# Patient Record
Sex: FEMALE | Race: WHITE
[De-identification: names, ages, dates, MRNs, and addresses within clinical notes are randomized per-mention and may not be internally consistent; named-entity substitution may affect disease eponyms.]

---

## 2017-04-28 ENCOUNTER — HOSPITAL ENCOUNTER (INPATIENT)
Dept: HOSPITAL 10 - FTE | Age: 22
LOS: 2 days | Discharge: HOME | DRG: 770 | End: 2017-04-30
Attending: OBSTETRICS & GYNECOLOGY | Admitting: OBSTETRICS & GYNECOLOGY
Payer: COMMERCIAL

## 2017-04-28 VITALS
WEIGHT: 99.21 LBS | HEIGHT: 60 IN | WEIGHT: 99.21 LBS | BODY MASS INDEX: 19.48 KG/M2 | HEIGHT: 60 IN | BODY MASS INDEX: 19.48 KG/M2

## 2017-04-28 DIAGNOSIS — D62: ICD-10-CM

## 2017-04-28 DIAGNOSIS — O03.1: Primary | ICD-10-CM

## 2017-04-28 LAB
ADD SCAN DIFF: NO
ADD UMIC: YES
ALBUMIN SERPL-MCNC: 3.9 G/DL (ref 3.3–4.9)
ALBUMIN/GLOB SERPL: 1.34 {RATIO}
ALP SERPL-CCNC: 75 IU/L (ref 42–121)
ALT SERPL-CCNC: 20 IU/L (ref 13–69)
ANION GAP SERPL CALC-SCNC: 10 MMOL/L (ref 8–16)
AST SERPL-CCNC: 23 IU/L (ref 15–46)
BACTERIA #/AREA URNS HPF: (no result) /[HPF]
BASOPHILS # BLD AUTO: 0 10^3/UL (ref 0–0.1)
BASOPHILS NFR BLD: 0.1 % (ref 0–2)
BILIRUB DIRECT SERPL-MCNC: 0 MG/DL (ref 0–0.2)
BILIRUB SERPL-MCNC: 0.1 MG/DL (ref 0.2–1.3)
BUN SERPL-MCNC: 14 MG/DL (ref 7–20)
CALCIUM SERPL-MCNC: 9 MG/DL (ref 8.4–10.2)
CHLORIDE SERPL-SCNC: 103 MMOL/L (ref 97–110)
CO2 SERPL-SCNC: 24 MMOL/L (ref 21–31)
COLOR UR: (no result)
CREAT SERPL-MCNC: 0.58 MG/DL (ref 0.44–1)
EOSINOPHIL # BLD: 0 10^3/UL (ref 0–0.5)
EOSINOPHIL NFR BLD: 0 % (ref 0–7)
ERYTHROCYTE [DISTWIDTH] IN BLOOD BY AUTOMATED COUNT: 12.5 % (ref 11.5–14.5)
GLOBULIN SER-MCNC: 2.9 G/DL (ref 1.3–3.2)
GLUCOSE SERPL-MCNC: 134 MG/DL (ref 70–220)
GLUCOSE UR STRIP-MCNC: NEGATIVE %
HCT VFR BLD CALC: 23.6 % (ref 37–47)
HGB BLD-MCNC: 7.9 G/DL (ref 12–16)
KETONES UR STRIP.AUTO-MCNC: (no result) MG/DL
LYMPHOCYTES # BLD AUTO: 1.7 10^3/UL (ref 0.8–2.9)
LYMPHOCYTES NFR BLD AUTO: 7.9 % (ref 15–51)
MCH RBC QN AUTO: 30 PG (ref 29–33)
MCHC RBC AUTO-ENTMCNC: 33.5 G/DL (ref 32–37)
MCV RBC AUTO: 89.7 FL (ref 82–101)
MONOCYTES # BLD: 0.8 10^3/UL (ref 0.3–0.9)
MONOCYTES NFR BLD: 3.6 % (ref 0–11)
NEUTROPHILS # BLD: 19 10^3/UL (ref 1.6–7.5)
NEUTROPHILS NFR BLD AUTO: 87.5 % (ref 39–77)
NITRITE UR QL STRIP.AUTO: NEGATIVE
NRBC # BLD MANUAL: 0 10^3/UL (ref 0–0)
NRBC BLD QL: 0 /100WBC (ref 0–0)
PLATELET # BLD: 362 10^3/UL (ref 140–415)
PMV BLD AUTO: 9.9 FL (ref 7.4–10.4)
POTASSIUM SERPL-SCNC: 3.7 MMOL/L (ref 3.5–5.1)
PROT SERPL-MCNC: 6.8 G/DL (ref 6.1–8.1)
RBC # BLD AUTO: 2.63 10^6/UL (ref 4.2–5.4)
RBC # UR AUTO: (no result) /UL
RBC #/AREA URNS HPF: >200 /HPF
SODIUM SERPL-SCNC: 133 MMOL/L (ref 135–144)
SQUAMOUS #/AREA URNS HPF: (no result) /[HPF]
URINE BILIRUBIN (DIP): NEGATIVE
URINE TOTAL PROTEIN (DIP): (no result)
UROBILINOGEN UR STRIP-ACNC: (no result) (ref 0.1–1)
WBC # BLD AUTO: 21.7 10^3/UL (ref 4.8–10.8)
WBC # UR STRIP: NEGATIVE /UL

## 2017-04-28 PROCEDURE — 76830 TRANSVAGINAL US NON-OB: CPT

## 2017-04-28 PROCEDURE — 81001 URINALYSIS AUTO W/SCOPE: CPT

## 2017-04-28 PROCEDURE — P9016 RBC LEUKOCYTES REDUCED: HCPCS

## 2017-04-28 PROCEDURE — 71010: CPT

## 2017-04-28 PROCEDURE — P9059 PLASMA, FRZ BETWEEN 8-24HOUR: HCPCS

## 2017-04-28 PROCEDURE — 86920 COMPATIBILITY TEST SPIN: CPT

## 2017-04-28 PROCEDURE — 76856 US EXAM PELVIC COMPLETE: CPT

## 2017-04-28 PROCEDURE — 81003 URINALYSIS AUTO W/O SCOPE: CPT

## 2017-04-28 PROCEDURE — 36430 TRANSFUSION BLD/BLD COMPNT: CPT

## 2017-04-28 PROCEDURE — 93005 ELECTROCARDIOGRAM TRACING: CPT

## 2017-04-28 PROCEDURE — 85730 THROMBOPLASTIN TIME PARTIAL: CPT

## 2017-04-28 PROCEDURE — 86850 RBC ANTIBODY SCREEN: CPT

## 2017-04-28 PROCEDURE — 84702 CHORIONIC GONADOTROPIN TEST: CPT

## 2017-04-28 PROCEDURE — 86901 BLOOD TYPING SEROLOGIC RH(D): CPT

## 2017-04-28 PROCEDURE — 85610 PROTHROMBIN TIME: CPT

## 2017-04-28 PROCEDURE — 80053 COMPREHEN METABOLIC PANEL: CPT

## 2017-04-28 PROCEDURE — 86900 BLOOD TYPING SEROLOGIC ABO: CPT

## 2017-04-28 PROCEDURE — 86078 PHYS BLOOD BANK SERV REACTJ: CPT

## 2017-04-28 PROCEDURE — 85025 COMPLETE CBC W/AUTO DIFF WBC: CPT

## 2017-04-28 PROCEDURE — 88307 TISSUE EXAM BY PATHOLOGIST: CPT

## 2017-04-28 NOTE — RADRPT
PROCEDURE:   US Pelvis. 

 

CLINICAL INDICATION:   Vaginal bleeding after miscarriage 2 weeks ago

 

TECHNIQUE:   Multiple sonographic images of the pelvis were obtained utilizing a transabdominal and 
endovaginal technique.   The images were reviewed on a PACS workstation. 

 

COMPARISON:   None available 

 

FINDINGS:

 

Uterus: Normal in size, contour and echogenicity with no evidence for myometrial masses. Size is est
imated at 8.3 x 5.4 x 4.5 cm.

 

Cervix:  No abnormalities of significance are seen.

 

Endometrium:  Increased in thickness; 18.8 mm. Hypervascular flow on Doppler interrogation cannot ex
clude retained products of conception.

 

Right ovary / adnexa:  Normal in size estimated at 3 x 2.1 x 1.3 cm.  No evidence for masses, normal
 blood flow on Doppler interrogation.

 

Left ovary/adnexa: Normal in size estimated at 3.1 x 2.1 x 1.7 cm.  No evidence for solid masses, no
rmal blood flow on Doppler interrogation. 

 

Cul-de-sac:  No evidence of free fluid.  

 

 

RPTAT:HJJR

 

IMPRESSION:

 

1.  Thickened hypervascular endometrium of 18.8 mm unable to exclude retained products of conception
 given the provided history.

2.  Sonographically normal myometrium, ovaries and adnexa.

_____________________________________________ 

Physician Tigre           Date    Time 

Electronically viewed and signed by Physician Tigre on 04/28/2017 23:20 

 

D:  04/28/2017 23:20  T:  04/28/2017 23:20

/

## 2017-04-29 VITALS — SYSTOLIC BLOOD PRESSURE: 101 MMHG | RESPIRATION RATE: 16 BRPM | HEART RATE: 100 BPM | DIASTOLIC BLOOD PRESSURE: 56 MMHG

## 2017-04-29 VITALS — HEART RATE: 78 BPM | DIASTOLIC BLOOD PRESSURE: 58 MMHG | RESPIRATION RATE: 17 BRPM | SYSTOLIC BLOOD PRESSURE: 104 MMHG

## 2017-04-29 VITALS — RESPIRATION RATE: 16 BRPM | HEART RATE: 71 BPM | SYSTOLIC BLOOD PRESSURE: 96 MMHG | DIASTOLIC BLOOD PRESSURE: 57 MMHG

## 2017-04-29 VITALS — RESPIRATION RATE: 16 BRPM | DIASTOLIC BLOOD PRESSURE: 53 MMHG | HEART RATE: 83 BPM | SYSTOLIC BLOOD PRESSURE: 98 MMHG

## 2017-04-29 VITALS — SYSTOLIC BLOOD PRESSURE: 106 MMHG | RESPIRATION RATE: 17 BRPM | DIASTOLIC BLOOD PRESSURE: 60 MMHG | HEART RATE: 112 BPM

## 2017-04-29 VITALS — HEART RATE: 76 BPM | RESPIRATION RATE: 16 BRPM | DIASTOLIC BLOOD PRESSURE: 57 MMHG | SYSTOLIC BLOOD PRESSURE: 106 MMHG

## 2017-04-29 VITALS — RESPIRATION RATE: 14 BRPM | HEART RATE: 102 BPM | DIASTOLIC BLOOD PRESSURE: 57 MMHG | SYSTOLIC BLOOD PRESSURE: 104 MMHG

## 2017-04-29 VITALS — SYSTOLIC BLOOD PRESSURE: 103 MMHG | HEART RATE: 98 BPM | RESPIRATION RATE: 15 BRPM | DIASTOLIC BLOOD PRESSURE: 64 MMHG

## 2017-04-29 VITALS — DIASTOLIC BLOOD PRESSURE: 53 MMHG | HEART RATE: 82 BPM | RESPIRATION RATE: 16 BRPM | SYSTOLIC BLOOD PRESSURE: 98 MMHG

## 2017-04-29 VITALS — SYSTOLIC BLOOD PRESSURE: 96 MMHG | RESPIRATION RATE: 16 BRPM | DIASTOLIC BLOOD PRESSURE: 52 MMHG | HEART RATE: 82 BPM

## 2017-04-29 VITALS — HEART RATE: 73 BPM | RESPIRATION RATE: 16 BRPM | SYSTOLIC BLOOD PRESSURE: 104 MMHG | DIASTOLIC BLOOD PRESSURE: 61 MMHG

## 2017-04-29 VITALS — HEART RATE: 98 BPM | RESPIRATION RATE: 18 BRPM | DIASTOLIC BLOOD PRESSURE: 59 MMHG | SYSTOLIC BLOOD PRESSURE: 112 MMHG

## 2017-04-29 VITALS — HEART RATE: 96 BPM | RESPIRATION RATE: 16 BRPM | SYSTOLIC BLOOD PRESSURE: 97 MMHG | DIASTOLIC BLOOD PRESSURE: 55 MMHG

## 2017-04-29 VITALS — HEART RATE: 98 BPM | RESPIRATION RATE: 16 BRPM | SYSTOLIC BLOOD PRESSURE: 94 MMHG | DIASTOLIC BLOOD PRESSURE: 52 MMHG

## 2017-04-29 VITALS — SYSTOLIC BLOOD PRESSURE: 100 MMHG | RESPIRATION RATE: 16 BRPM | HEART RATE: 72 BPM | DIASTOLIC BLOOD PRESSURE: 52 MMHG

## 2017-04-29 VITALS — DIASTOLIC BLOOD PRESSURE: 57 MMHG | RESPIRATION RATE: 18 BRPM | SYSTOLIC BLOOD PRESSURE: 104 MMHG | HEART RATE: 105 BPM

## 2017-04-29 VITALS — RESPIRATION RATE: 16 BRPM | HEART RATE: 71 BPM | SYSTOLIC BLOOD PRESSURE: 100 MMHG | DIASTOLIC BLOOD PRESSURE: 52 MMHG

## 2017-04-29 VITALS — SYSTOLIC BLOOD PRESSURE: 109 MMHG | RESPIRATION RATE: 20 BRPM | DIASTOLIC BLOOD PRESSURE: 70 MMHG

## 2017-04-29 VITALS — SYSTOLIC BLOOD PRESSURE: 103 MMHG | DIASTOLIC BLOOD PRESSURE: 58 MMHG | HEART RATE: 108 BPM | RESPIRATION RATE: 16 BRPM

## 2017-04-29 VITALS — HEART RATE: 73 BPM | DIASTOLIC BLOOD PRESSURE: 62 MMHG | RESPIRATION RATE: 16 BRPM | SYSTOLIC BLOOD PRESSURE: 105 MMHG

## 2017-04-29 VITALS — TEMPERATURE: 99.6 F

## 2017-04-29 VITALS — SYSTOLIC BLOOD PRESSURE: 97 MMHG | DIASTOLIC BLOOD PRESSURE: 52 MMHG | HEART RATE: 83 BPM | RESPIRATION RATE: 16 BRPM

## 2017-04-29 VITALS — DIASTOLIC BLOOD PRESSURE: 50 MMHG | RESPIRATION RATE: 20 BRPM | SYSTOLIC BLOOD PRESSURE: 94 MMHG

## 2017-04-29 VITALS — HEART RATE: 104 BPM | RESPIRATION RATE: 15 BRPM | DIASTOLIC BLOOD PRESSURE: 53 MMHG | SYSTOLIC BLOOD PRESSURE: 102 MMHG

## 2017-04-29 VITALS — DIASTOLIC BLOOD PRESSURE: 57 MMHG | SYSTOLIC BLOOD PRESSURE: 99 MMHG | HEART RATE: 81 BPM | RESPIRATION RATE: 16 BRPM

## 2017-04-29 LAB
%HYPO/RBC NFR BLD AUTO: (no result) %
ABNORMAL IP MESSAGE: 1
ADD SCAN DIFF: NO
APTT BLD: 27.2 SEC (ref 25–35)
ERYTHROCYTE [DISTWIDTH] IN BLOOD BY AUTOMATED COUNT: 12.5 % (ref 11.5–14.5)
HCT VFR BLD CALC: 15.6 % (ref 37–47)
HGB BLD-MCNC: 5.2 G/DL (ref 12–16)
INR PPP: 1.04
LYMPHOCYTES # BLD AUTO: 2.1 10^3/UL (ref 0.8–2.9)
LYMPHOCYTES NFR BLD AUTO: 16 % (ref 15–51)
MCH RBC QN AUTO: 29.9 PG (ref 29–33)
MCHC RBC AUTO-ENTMCNC: 33.3 G/DL (ref 32–37)
MCV RBC AUTO: 89.7 FL (ref 82–101)
MICROCYTES BLD QL SMEAR: (no result)
MONOCYTES # BLD: 0.5 10^3/UL (ref 0.3–0.9)
MONOCYTES NFR BLD: 4 % (ref 0–11)
NEUTROPHILS # BLD: 10.7 10^3/UL (ref 1.6–7.5)
NEUTROPHILS NFR BLD AUTO: 80 % (ref 39–77)
PLATELET # BLD EST: (no result) 10*3/UL
PLATELET # BLD: 213 10^3/UL (ref 140–415)
PMV BLD AUTO: 10.1 FL (ref 7.4–10.4)
PROTHROMBIN TIME: 13.6 SEC (ref 12.2–14.2)
PT RATIO: 1.1
RBC # BLD AUTO: 1.74 10^6/UL (ref 4.2–5.4)
TOTAL CELLS COUNTED PERCENT: 100 %
WBC # BLD AUTO: 13.4 10^3/UL (ref 4.8–10.8)

## 2017-04-29 PROCEDURE — 10D17ZZ EXTRACTION OF PRODUCTS OF CONCEPTION, RETAINED, VIA NATURAL OR ARTIFICIAL OPENING: ICD-10-PCS | Performed by: OBSTETRICS & GYNECOLOGY

## 2017-04-29 PROCEDURE — 30233N1 TRANSFUSION OF NONAUTOLOGOUS RED BLOOD CELLS INTO PERIPHERAL VEIN, PERCUTANEOUS APPROACH: ICD-10-PCS | Performed by: OBSTETRICS & GYNECOLOGY

## 2017-04-29 NOTE — ERA
ER Documentation


Chief Complaint


Date/Time


DATE: 17 


TIME: 00:38


Chief Complaint


 2 weeks ago going through 2 pads per hour sin 1400





HPI


This is a 22-year-old female that presents to the ER stating that she had an 

 2 weeks ago.  Patient states that she has been having heavy bleeding 

with clotting today since 2 PM.  She has been going through 2 pads an hour.  

Patient went to [facility] yesterday and got an Implanon put in place.  

Patient states that she has been feeling dizzy and weak.   A0.  Patient 

does not have any urinary frequency or dysuria.  Patient does admit to some 

pelvic cramping.





ROS


12 point review of systems was done, all negative except per HPI.





Allergies


Allergies:  


Coded Allergies:  


     No Known Allergy (Unverified , 17)





PMhx/Soc


Medical and Surgical Hx:  pt denies Medical Hx


Hx Miscellaneous Medical Probl:  Yes (2 WEEKS AGO. NEXPLANON IMPLANT 16)


Hx Alcohol Use:  Yes (2X/ MONTH)


Hx Substance Use:  No


Hx Tobacco Use:  No


Smoking Status:  Never smoker





Physical Exam


Vitals





Vital Signs








  Date Time  Temp Pulse Resp B/P Pulse Ox O2 Delivery O2 Flow Rate FiO2


 


17 20:23 98.1 140 24 129/87 100   








Physical Exam


GENERAL: The patient is well developed and appropriate for usual state of health

, in no apparent distress.


HEENT: Atraumatic.  


CHEST: Clear to auscultation bilaterally. There are no rales, wheezes or 

rhonchi. 


HEART: Regular rate and rhythm. No murmurs, clicks, rubs or gallops.


ABDOMEN: Soft, nontender and nondistended. Good bowel sounds. No rebound or 

guarding. No gross peritonitis. No gross organomegaly or masses. No Lama sign 

or McBurney point tenderness.


BACK: No midline or flank tenderness.


: patient does have moderate vaginal bleeding


NEURO: Alert and oriented.


SKIN: The skin is warm and dry.


Result Diagram:  


17





Results 24 hrs





 Laboratory Tests








Test


  17


21:45


 


White Blood Count 21.710^3/ul 


 


Red Blood Count 2.6310^6/ul 


 


Hemoglobin 7.9g/dl 


 


Hematocrit 23.6% 


 


Mean Corpuscular Volume 89.7fl 


 


Mean Corpuscular Hemoglobin 30.0pg 


 


Mean Corpuscular Hemoglobin


Concent 33.5g/dl 


 


 


Red Cell Distribution Width 12.5% 


 


Platelet Count 02974^3/UL 


 


Mean Platelet Volume 9.9fl 


 


Neutrophils % 87.5% 


 


Lymphocytes % 7.9% 


 


Monocytes % 3.6% 


 


Eosinophils % 0.0% 


 


Basophils % 0.1% 


 


Nucleated Red Blood Cells % 0.0/100WBC 


 


Neutrophils # 19.010^3/ul 


 


Lymphocytes # 1.710^3/ul 


 


Monocytes # 0.810^3/ul 


 


Eosinophils # 0.010^3/ul 


 


Basophils # 0.010^3/ul 


 


Nucleated Red Blood Cells # 0.010^3/ul 


 


Urine Color LT. YELLOW 


 


Urine Clarity HAZY 


 


Urine pH 5.5 


 


Urine Specific Gravity 1.025 


 


Urine Ketones TRACE 


 


Urine Nitrite NEGATIVE 


 


Urine Bilirubin NEGATIVE 


 


Urine Urobilinogen 0.2  E.U./dL 


 


Urine Leukocyte Esterase NEGATIVE 


 


Urine Microscopic RBC >200/HPF 


 


Urine Microscopic WBC 0-2/HPF 


 


Urine Squamous Epithelial


Cells MODERATE 


 


 


Urine Bacteria FEW 


 


Urine Hemoglobin 3+ 


 


Urine Glucose NEGATIVE% 


 


Urine Total Protein TRACE 


 


Sodium Level 133mmol/L 


 


Potassium Level 3.7mmol/L 


 


Chloride Level 103mmol/L 


 


Carbon Dioxide Level 24mmol/L 


 


Anion Gap 10 


 


Blood Urea Nitrogen 14mg/dl 


 


Creatinine 0.58mg/dl 


 


Glucose Level 134mg/dl 


 


Calcium Level 9.0mg/dl 


 


Total Bilirubin 0.1mg/dl 


 


Direct Bilirubin 0.00mg/dl 


 


Indirect Bilirubin 0.1mg/dl 


 


Aspartate Amino Transf


(AST/SGOT) 23IU/L 


 


 


Alanine Aminotransferase


(ALT/SGPT) 20IU/L 


 


 


Alkaline Phosphatase 75IU/L 


 


Total Protein 6.8g/dl 


 


Albumin 3.9g/dl 


 


Globulin 2.90g/dl 


 


Albumin/Globulin Ratio 1.34 


 


Beta HCG, Quantitative 5707.6mIU/ml 








 Current Medications








 Medications


  (Trade)  Dose


 Ordered  Sig/Jake


 Route


 PRN Reason  Start Time


 Stop Time Status Last Admin


Dose Admin


 


 Sodium Chloride


  (NS)  1,000 ml @ 


 1,000 mls/hr  Q1H ONCE


 IV


   17 21:30


 17 22:29 DC 17 21:51


 











Procedures/MDM


This is a 22-year-old female that presents to the ER with continued vaginal 

bleeding for the last 2 weeks after having a portion.  Patient's hemoglobin is 

significantly decreased to 7.9 and her quantitative HCG is still elevated to 

the 5000's.  I consulted labor is Martín: Patient does need a D&C.  She will be 

admitted to the hospital.





Departure


Diagnosis:  


 Primary Impression:  


 Vaginal bleeding


Condition:  BRAXTON Gutierrez 2017 00:40

## 2017-04-29 NOTE — RADRPT
PROCEDURE:   XR Chest. 

 

CLINICAL INDICATION:  Abdominal pain.

 

TECHNIQUE:  Single frontal view of the chest.

 

COMPARISON:   None. 

 

FINDINGS:

Abnormal contour of the left heart border, with mild prominence at the superior margin of the left v
entricle apex, otherwise nonspecific.  Consider CT examination for further evaluation if clinically 
required.  

The lungs are clear. No signs of pleural fluid or pneumothorax are seen. The osseous structures and 
soft tissues are unremarkable. 

 

IMPRESSION:

1.  Abnormal contour of the left heart border, otherwise nonspecific.

2.  Consider CT correlation if clinically required.

3.  Otherwise, no evident acute cardiopulmonary disease.

 

RPTAT: UU

_____________________________________________ 

Physician Suhail           Date    Time 

Electronically viewed and signed by Physician Suhail on 04/29/2017 01:52 

 

D:  04/29/2017 01:52  T:  04/29/2017 01:52

RS/

## 2017-04-29 NOTE — PN
Date/Time of Note


Date/Time of Note


DATE: 4/29/17 


TIME: 10:49





Assessment/Plan


VTE Prophylaxis


VTE Prophylaxis Intervention:  ambulation





Lines/Catheters


IV Catheter Type (from [facility]):  Peripheral IV





Assessment/Plan


Chief Complaint/Hosp Course


Heavy vaginal bleeding, no episode of pregnancy, status post termination of 

family planning 


Presented with heavy vaginal bleeding and was noted to be anemic.


Underwent D&C.  Intra-Op finding consistent with likely POC.  Was sent to 

pathology


Patient reports the pregnancy was confirmed prior to D&C.


Will consider repeat hCG 12 hours after D&C to confirm appropriate drop to rule 

out for any  possible ectopic due to nonavailability of her prior ultrasound 

report


Severe anemia secondary to continued prolonged heavy blood loss status post 1 

unit blood transfusion.


Discussed with the patient needs to do 3 more units.





Consider repeat CBC 8 hours after last unit consider DC home tomorrow


If hemoglobin is stable and asymptomatic


Will follow up with the hCG results as well to confirm appropriate drop at 

least 50% or more in 12 hours after D&C which correlates more with miscarriage 


Rh+


Contraception: Patient currently has Nexplanon.  I discussed with the patient 

regarding backup contraception with condoms.  For the next month with a close 

follow-up with her OB/GYN within a couple of days to a week after discharge 

from the hospital


Patient will be signed off to upcoming labors for follow-up inpatient visits


Problems:  





Subjective


24 Hr Interval Summary


Free Text/Dictation


Patient reports feeling tired.  Has not gotten out of the bed since surgery.  

Denies any dizziness or lightheadedness.  She feels significant fatigue.  She 

has currently received 1 unit of PRBC, vaginal bleeding minimal patient reports 

that she has received her Nexplanon.  About a couple weeks ago.


She reports that she had a formal ultrasound confirmed IUP prior to proceed 

[facility].


Constitutional:  other (Fatigue)


Eyes:  no complaints


ENT:  no complaints


Respiratory:  no complaints


Cardiovascular:  no complaints


Gastrointestinal:  no complaints


Genitourinary:  no complaints


Musculoskeletal:  no complaints


Skin:  no complaints


Neurologic:  no complaints


Endocrine:  no complaints


Lymphatic:  no complaints


Psychological:  no complaints


Immunologic:  no complaints





Exam/Review of Systems


Vital Signs


Vitals





 Vital Signs








  Date Time  Temp Pulse Resp B/P Pulse Ox O2 Delivery O2 Flow Rate FiO2


 


4/29/17 08:00 98.7 94 20 94/50 98   


 


4/29/17 05:58      Room Air  


 


4/29/17 02:59       8.0 














 Intake and Output   


 


 4/28/17 4/28/17 4/29/17





 15:00 23:00 07:00


 


Intake Total   800 ml


 


Output Total   100 ml


 


Balance   700 ml











Exam


Constitutional:  alert, oriented, well developed


Psych:  nl mood/affect, no complaints


Head:  atraumatic, normocephalic


Eyes:  EOMI, nl conjunctiva, nl lids


Gastrointestinal:  non-tender, soft


Genitourinary - Female:  other (Pelvic exam deferred)


Musculoskeletal:  nl extremities to inspection, nl gait and stance


Extremities:  normal pulses


Neurological:  CNS II-XII intact, nl mental status, nl speech, nl strength


Skin:  nl turgor





Results


Result Diagram:  


4/29/17 0437                                                                   

             4/28/17 2145





Results 24 hrs





Laboratory Tests








Test


  4/28/17


21:45 4/29/17


04:37


 


White Blood Count 21.7  H 13.4  #H


 


Red Blood Count 2.63  L 1.74  #L


 


Hemoglobin 7.9  L 5.2  #*L


 


Hematocrit 23.6  L 15.6  #L


 


Mean Corpuscular Volume 89.7   89.7  


 


Mean Corpuscular Hemoglobin 30.0   29.9  


 


Mean Corpuscular Hemoglobin


Concent 33.5  


  33.3  


 


 


Red Cell Distribution Width 12.5   12.5  


 


Platelet Count 362   213  #


 


Mean Platelet Volume 9.9   10.1  


 


Neutrophils % 87.5  H 80.0  H


 


Lymphocytes % 7.9  L 16.0  


 


Monocytes % 3.6   4.0  


 


Eosinophils % 0.0   


 


Basophils % 0.1   


 


Nucleated Red Blood Cells % 0.0   


 


Neutrophils # 19.0  H 10.7  H


 


Lymphocytes # 1.7   2.1  


 


Monocytes # 0.8   0.5  


 


Eosinophils # 0.0   


 


Basophils # 0.0   


 


Nucleated Red Blood Cells # 0.0   


 


Prothrombin Time 13.6   


 


Prothrombin Time Ratio 1.1   


 


INR International Normalized


Ratio 1.04  


  


 


 


Activated Partial


Thromboplast Time 27.2  


  


 


 


Urine Color LT. YELLOW   


 


Urine Clarity HAZY   


 


Urine pH 5.5   


 


Urine Specific Gravity 1.025   


 


Urine Ketones TRACE  H 


 


Urine Nitrite NEGATIVE   


 


Urine Bilirubin NEGATIVE   


 


Urine Urobilinogen 0.2  E.U./dL   


 


Urine Leukocyte Esterase NEGATIVE   


 


Urine Microscopic RBC >200   


 


Urine Microscopic WBC 0-2   


 


Urine Squamous Epithelial


Cells MODERATE  


  


 


 


Urine Bacteria FEW   


 


Urine Hemoglobin 3+  H 


 


Urine Glucose NEGATIVE   


 


Urine Total Protein TRACE   


 


Sodium Level 133  L 


 


Potassium Level 3.7   


 


Chloride Level 103   


 


Carbon Dioxide Level 24   


 


Anion Gap 10   


 


Blood Urea Nitrogen 14   


 


Creatinine 0.58   


 


Glucose Level 134   


 


Calcium Level 9.0   


 


Total Bilirubin 0.1  L 


 


Direct Bilirubin 0.00   


 


Indirect Bilirubin 0.1   


 


Aspartate Amino Transf


(AST/SGOT) 23  


  


 


 


Alanine Aminotransferase


(ALT/SGPT) 20  


  


 


 


Alkaline Phosphatase 75   


 


Total Protein 6.8   


 


Albumin 3.9   


 


Globulin 2.90   


 


Albumin/Globulin Ratio 1.34   


 


Beta HCG, Quantitative 5707.6   3605.0  


 


Platelet Estimate


  


  PLT APPEAR


ADEQUATE


 


Hypochromasia  1+  


 


Microcytosis  1+  











Medications


Medications





 Current Medications


Oxycodone/ Acetaminophen (Percocet (5/ 325)) 1 tab Q4H  PRN PO PAIN LEVEL 1-5 

Last administered on 4/29/17at 04:27; Admin Dose 1 TAB;  Start 4/29/17 at 04:30


Oxycodone/ Acetaminophen 2 tab 2 tab Q4H  PRN PO PAIN LEVEL 6-10;  Start 4/29/ 17 at 04:30


Lactated Ringer's (Lr) 1,000 ml @  125 mls/hr Q8H ONCE IV  Last administered on 

4/29/17at 04:28; Admin Dose 125 MLS/HR;  Start 4/29/17 at 04:30;  Stop 4/29/17 

at 12:29


Ibuprofen (Motrin) 600 mg Q6H  PRN PO PAIN;  Start 4/29/17 at 06:00











NADIA HUERTA MD Apr 29, 2017 10:55

## 2017-04-29 NOTE — OPR
DATE OF OPERATION:  2017

 

 

PREOPERATIVE DIAGNOSIS:  Incomplete .

 

POSTOPERATIVE DIAGNOSIS:  Incomplete .

 

OPERATION PERFORMED:  Dilation and curettage and suction

 

ATTENDING SURGEON: JOYCELYN BARRAZA MD

 

ANESTHESIOLOGIST:   ____

 

TYPE OF ANESTHESIA:  General.

 

COMPLICATIONS:  None.

 

ESTIMATED BLOOD LOSS:  Less than 20 mL.

 

DESCRIPTION OF PROCEDURE:  The patient was taken to the operating room where general anesthesia was 
found to be adequate.  The patient was placed in dorsal lithotomy position.  After prep and drape, a
 weighted speculum was placed inside the vaginal vault.  Anterior lip of the cervix was grasped by s
carlo-tooth tenaculum.  Cervix was 2 cm open and some products of conception were at the cervix whic
h were removed by a sponge forceps.  Then suction tip size 8 was inserted.  Intrauterine cavity was 
suctioned.  Sharp curettage of endometrial cavity was done.  Hemostasis achieved.  Instruments remov
ed.  The patient tolerated the procedure well and was transferred to the recovery room in stable con
dition.  There was no complication regarding this surgery.

 

 

Dictated By: JOYCELYN PARADA/KOFFI

DD:    2017 03:38:30

DT:    2017 06:24:59

Conf#: [id number]

DID#:  [id number]

## 2017-04-29 NOTE — HP
DATE OF ADMISSION: 2017

 

HISTORY OF PRESENT ILLNESS:  This is a 22-year-old,  1, para 0 admitted to the emergency room
 with vaginal bleeding.  The patient had a miscarriage earlier and had a good amount of bleeding.  A
t the time of the admission she had around 8-10 mL clots inside the vaginal vault.  Hemoglobin was 7
.8 and no abdominal pain except some cramps.  The patient had been evaluated in [facility] a
nd apparently received an implant yesterday.

 

PAST MEDICAL HISTORY:  Denies.

 

PAST SURGICAL HISTORY:  Denies.

 

ALLERGIES:  NKDA.

 

PHYSICAL EXAMINATION:

VITAL SIGNS:  Stable.

GENERAL:  Normal.

ABDOMEN:  Not tender, not distended.

GENITAL:  Cervix was 2 cm open, some products of conception at the cervix.

 

ASSESSMENT AND PLAN:  A 22-year-old,  1, para 0.  Status post incomplete  with vagina
l bleeding.  There is no record available from her care in [facility], although, their diagn
osis of ectopic pregnancy is not excluded completely but it seems that it is an incomplete .
  The patient was consented for dilation and curettage and suction, but she knows that she needs to 
be followed up closely with her private doctor for serial beta hCG and if she has any abdominal pain
 or any unusual symptoms, she needs to go to the hospital immediately.  The patient was consented fo
r dilation and curettage and suction.  Risks and benefits were extensively discussed with the patien
t.

 

 

Dictated By: JOYCELYN PARADA/KOFFI

DD:    2017 03:41:01

DT:    2017 06:40:05

Conf#: [id number]

DID#:  [id number]

## 2017-04-29 NOTE — QN
Documentation


Comment


patient is discussed extensively about the blood transfusion 


Risks and benefits discussed


3 units PRBc and 1 unit FFP will be given to patient











JOYCELYN BARRAZA M.D. Apr 29, 2017 05:21

## 2017-04-30 VITALS — RESPIRATION RATE: 16 BRPM | HEART RATE: 77 BPM | SYSTOLIC BLOOD PRESSURE: 93 MMHG | DIASTOLIC BLOOD PRESSURE: 54 MMHG

## 2017-04-30 VITALS — SYSTOLIC BLOOD PRESSURE: 89 MMHG | DIASTOLIC BLOOD PRESSURE: 53 MMHG | RESPIRATION RATE: 16 BRPM | HEART RATE: 74 BPM

## 2017-04-30 VITALS — RESPIRATION RATE: 18 BRPM | SYSTOLIC BLOOD PRESSURE: 119 MMHG | DIASTOLIC BLOOD PRESSURE: 77 MMHG | HEART RATE: 113 BPM

## 2017-04-30 VITALS — DIASTOLIC BLOOD PRESSURE: 50 MMHG | SYSTOLIC BLOOD PRESSURE: 93 MMHG | HEART RATE: 80 BPM | RESPIRATION RATE: 18 BRPM

## 2017-04-30 VITALS — RESPIRATION RATE: 16 BRPM | SYSTOLIC BLOOD PRESSURE: 92 MMHG | HEART RATE: 77 BPM | DIASTOLIC BLOOD PRESSURE: 50 MMHG

## 2017-04-30 VITALS — RESPIRATION RATE: 16 BRPM | HEART RATE: 122 BPM | DIASTOLIC BLOOD PRESSURE: 73 MMHG | SYSTOLIC BLOOD PRESSURE: 121 MMHG

## 2017-04-30 VITALS — DIASTOLIC BLOOD PRESSURE: 56 MMHG | SYSTOLIC BLOOD PRESSURE: 102 MMHG | RESPIRATION RATE: 20 BRPM

## 2017-04-30 VITALS — RESPIRATION RATE: 16 BRPM | SYSTOLIC BLOOD PRESSURE: 105 MMHG | DIASTOLIC BLOOD PRESSURE: 65 MMHG | HEART RATE: 81 BPM

## 2017-04-30 VITALS — SYSTOLIC BLOOD PRESSURE: 93 MMHG | HEART RATE: 78 BPM | DIASTOLIC BLOOD PRESSURE: 52 MMHG | RESPIRATION RATE: 16 BRPM

## 2017-04-30 LAB
ADD SCAN DIFF: NO
ADD UMIC: YES
BACTERIA #/AREA URNS HPF: (no result) /[HPF]
BASOPHILS # BLD AUTO: 0 10^3/UL (ref 0–0.1)
BASOPHILS NFR BLD: 0.2 % (ref 0–2)
COLOR UR: (no result)
EOSINOPHIL # BLD: 0.3 10^3/UL (ref 0–0.5)
EOSINOPHIL NFR BLD: 3.5 % (ref 0–7)
ERYTHROCYTE [DISTWIDTH] IN BLOOD BY AUTOMATED COUNT: 13.2 % (ref 11.5–14.5)
GLUCOSE UR STRIP-MCNC: NEGATIVE %
HCT VFR BLD CALC: 28.6 % (ref 37–47)
HGB BLD-MCNC: 10 G/DL (ref 12–16)
KETONES UR STRIP.AUTO-MCNC: 15 MG/DL
LYMPHOCYTES # BLD AUTO: 2.5 10^3/UL (ref 0.8–2.9)
LYMPHOCYTES NFR BLD AUTO: 29.1 % (ref 15–51)
MCH RBC QN AUTO: 30.6 PG (ref 29–33)
MCHC RBC AUTO-ENTMCNC: 35 G/DL (ref 32–37)
MCV RBC AUTO: 87.5 FL (ref 82–101)
MONOCYTES # BLD: 0.6 10^3/UL (ref 0.3–0.9)
MONOCYTES NFR BLD: 7.1 % (ref 0–11)
NEUTROPHILS # BLD: 5.1 10^3/UL (ref 1.6–7.5)
NEUTROPHILS NFR BLD AUTO: 59.5 % (ref 39–77)
NITRITE UR QL STRIP.AUTO: NEGATIVE
NRBC # BLD MANUAL: 0 10^3/UL (ref 0–0)
NRBC BLD QL: 0 /100WBC (ref 0–0)
PLATELET # BLD: 198 10^3/UL (ref 140–415)
PMV BLD AUTO: 9.9 FL (ref 7.4–10.4)
POST-TRANSFUSION BILIRUBIN: 0.3 MG/DL
PRETRANSFUSION BILIRUBIN: 0.1 MG/DL
RBC # BLD AUTO: 3.27 10^6/UL (ref 4.2–5.4)
RBC # UR AUTO: (no result) /UL
RBC #/AREA URNS HPF: >200 /HPF
SQUAMOUS #/AREA URNS HPF: (no result) /[HPF]
URINE BILIRUBIN (DIP): NEGATIVE
URINE TOTAL PROTEIN (DIP): NEGATIVE
UROBILINOGEN UR STRIP-ACNC: (no result) (ref 0.1–1)
WBC # BLD AUTO: 8.5 10^3/UL (ref 4.8–10.8)
WBC # UR STRIP: NEGATIVE /UL

## 2017-04-30 NOTE — PD.PPDC
OB/GYN Discharge Instruction


Condition


Patient Condition:  Good





Diet


Diet:  Resume Regular Diet





Activity/Restrictions








Activity:   Normal Activity





 May Shower














Restrictions:   No Sexual Activity





 Nothing in the Vagina





 No [place]





 No Tampons, douche











Follow-up


Follow-up with Physician:  6, Week/Weeks





Return to clinic for








GYN Instructions:   Fever greater than 101





 Chills





 Worsening abdominal pain





 Excessive Vaginal Bleeding

















SARAH MARIN MD Apr 30, 2017 12:18

## 2017-04-30 NOTE — DS
Date/Time of Note


Date/Time of Note


DATE: 17 


TIME: 12:19





Discharge Summary


Admission/Discharge Info


Admit Date/Time


2017 at 02:08


Discharge Date/Time





Final Diagnosis


Incomplete 


Patient Condition:  Good


Procedures


Dilitation and curretage. 


Transfuse 3 units PRBC's.


Hospital Course


Pt went to [facility] for a medical  2 weeks ago. She 

did pass tissue and she did go back for follow-up  and was told there was 

still tissue present and that it should come out on its own. On  she bled 

heavily all day and came to the ER. Her Hgb was 5.2. She had a D and C with 

tissue retrieved. She has minimal bleeding now. She received 3 units PRBC's. An 

order for FFP was initiated but she began to have a tranfusion reaction so it 

was terminated and benadryl given. Her Hgb today is 10. Her BHCG has gone from 

5700 to 3600 and to 1700 this AM, i.e a normal downward trend.


Follow-up Plan


Pt to follow-up with [facility] to let them know what happened.


Pt received an Implanon implant on .


Pelvic rest x 2 weeks.


Pending Labs





Laboratory Tests








Test


  17


02:19 17


03:00


 


White Blood Count


  8.510^3/ul


(4.8-10.8) 


 


 


Red Blood Count


  3.2710^6/ul


(4.20-5.40) 


 


 


Hemoglobin


  10.0g/dl


(12.0-16.0) 


 


 


Hematocrit


  28.6%


(37.0-47.0) 


 


 


Mean Corpuscular Volume


  87.5fl


(82.0-101.0) 


 


 


Mean Corpuscular Hemoglobin


  30.6pg


(29.0-33.0) 


 


 


Mean Corpuscular Hemoglobin


Concent 35.0g/dl


(32.0-37.0) 


 


 


Red Cell Distribution Width


  13.2%


(11.5-14.5) 


 


 


Platelet Count


  83633^3/UL


(140-415) 


 


 


Mean Platelet Volume


  9.9fl


(7.4-10.4) 


 


 


Neutrophils %


  59.5%


(39.0-77.0) 


 


 


Lymphocytes %


  29.1%


(15.0-51.0) 


 


 


Monocytes %


  7.1%


(0.0-11.0) 


 


 


Eosinophils % 3.5% (0.0-7.0)  


 


Basophils % 0.2% (0.0-2.0)  


 


Nucleated Red Blood Cells %


  0.0/100WBC


(0.0-0.0) 


 


 


Neutrophils #


  5.110^3/ul


(1.6-7.5) 


 


 


Lymphocytes #


  2.510^3/ul


(0.8-2.9) 


 


 


Monocytes #


  0.610^3/ul


(0.3-0.9) 


 


 


Eosinophils #


  0.310^3/ul


(0.0-0.5) 


 


 


Basophils #


  0.010^3/ul


(0.0-0.1) 


 


 


Nucleated Red Blood Cells #


  0.010^3/ul


(0.0-0.0) 


 


 


Beta HCG, Quantitative 1745.8mIU/ml  


 


Urine Color


  


  LT. YELLOW


(YELLOW)


 


Urine Clarity


  


  SL HAZY


(CLEAR)


 


Urine pH  6.5 (5.0-9.0) 


 


Urine Specific Gravity


  


  1.015


(1.003-1.030)


 


Urine Ketones  15 (NEGATIVE) 


 


Urine Nitrite


  


  NEGATIVE


(NEGATIVE)


 


Urine Bilirubin


  


  NEGATIVE


(NEGATIVE)


 


Urine Urobilinogen


  


  0.2  E.U./dL


(0.1-1.0)


 


Urine Leukocyte Esterase


  


  NEGATIVE


(NEGATIVE)


 


Urine Microscopic RBC  >200/HPF (0) 


 


Urine Microscopic WBC  0-2/HPF (0) 


 


Urine Squamous Epithelial


Cells 


  MANY 


 


 


Urine Bacteria  OCCASIONAL 


 


Urine Hemoglobin  3+ (NEGATIVE) 


 


Urine Glucose


  


  NEGATIVE%


(NEGATIVE)


 


Urine Total Protein


  


  NEGATIVE


(NEGATIVE)

















SARAH MARIN MD 2017 12:27

## 2018-03-25 ENCOUNTER — HOSPITAL ENCOUNTER (EMERGENCY)
Dept: HOSPITAL 91 - FTE | Age: 23
Discharge: HOME | End: 2018-03-25
Payer: COMMERCIAL

## 2018-03-25 ENCOUNTER — HOSPITAL ENCOUNTER (EMERGENCY)
Age: 23
Discharge: HOME | End: 2018-03-25

## 2018-03-25 DIAGNOSIS — H57.8: Primary | ICD-10-CM

## 2018-03-25 PROCEDURE — 99283 EMERGENCY DEPT VISIT LOW MDM: CPT

## 2018-05-01 ENCOUNTER — HOSPITAL ENCOUNTER (EMERGENCY)
Age: 23
Discharge: HOME | End: 2018-05-01

## 2018-05-01 ENCOUNTER — HOSPITAL ENCOUNTER (EMERGENCY)
Dept: HOSPITAL 91 - FTE | Age: 23
Discharge: HOME | End: 2018-05-01
Payer: COMMERCIAL

## 2018-05-01 DIAGNOSIS — R21: Primary | ICD-10-CM

## 2018-05-01 PROCEDURE — 99283 EMERGENCY DEPT VISIT LOW MDM: CPT

## 2018-08-26 ENCOUNTER — HOSPITAL ENCOUNTER (EMERGENCY)
Age: 23
Discharge: HOME | End: 2018-08-26

## 2018-08-26 ENCOUNTER — HOSPITAL ENCOUNTER (EMERGENCY)
Dept: HOSPITAL 91 - FTE | Age: 23
Discharge: HOME | End: 2018-08-26
Payer: COMMERCIAL

## 2018-08-26 DIAGNOSIS — L03.116: Primary | ICD-10-CM

## 2018-08-26 PROCEDURE — 99284 EMERGENCY DEPT VISIT MOD MDM: CPT
